# Patient Record
Sex: FEMALE | Race: WHITE | NOT HISPANIC OR LATINO | Employment: OTHER | URBAN - METROPOLITAN AREA
[De-identification: names, ages, dates, MRNs, and addresses within clinical notes are randomized per-mention and may not be internally consistent; named-entity substitution may affect disease eponyms.]

---

## 2020-09-16 ENCOUNTER — OFFICE VISIT (OUTPATIENT)
Dept: OBSTETRICS AND GYNECOLOGY | Facility: CLINIC | Age: 45
End: 2020-09-16
Payer: MEDICARE

## 2020-09-16 VITALS
RESPIRATION RATE: 12 BRPM | HEIGHT: 69 IN | WEIGHT: 171 LBS | DIASTOLIC BLOOD PRESSURE: 67 MMHG | HEART RATE: 72 BPM | BODY MASS INDEX: 25.33 KG/M2 | SYSTOLIC BLOOD PRESSURE: 122 MMHG

## 2020-09-16 DIAGNOSIS — F32.81 PMDD (PREMENSTRUAL DYSPHORIC DISORDER): ICD-10-CM

## 2020-09-16 DIAGNOSIS — Z12.4 CERVICAL CANCER SCREENING: Primary | ICD-10-CM

## 2020-09-16 PROCEDURE — 99203 OFFICE O/P NEW LOW 30 MIN: CPT | Mod: S$PBB,,, | Performed by: ADVANCED PRACTICE MIDWIFE

## 2020-09-16 PROCEDURE — 99999 PR PBB SHADOW E&M-NEW PATIENT-LVL III: ICD-10-PCS | Mod: PBBFAC,,, | Performed by: ADVANCED PRACTICE MIDWIFE

## 2020-09-16 PROCEDURE — 99203 PR OFFICE/OUTPT VISIT, NEW, LEVL III, 30-44 MIN: ICD-10-PCS | Mod: S$PBB,,, | Performed by: ADVANCED PRACTICE MIDWIFE

## 2020-09-16 PROCEDURE — 99203 OFFICE O/P NEW LOW 30 MIN: CPT | Mod: PBBFAC,PN | Performed by: ADVANCED PRACTICE MIDWIFE

## 2020-09-16 PROCEDURE — 99999 PR PBB SHADOW E&M-NEW PATIENT-LVL III: CPT | Mod: PBBFAC,,, | Performed by: ADVANCED PRACTICE MIDWIFE

## 2020-09-16 NOTE — PROGRESS NOTES
Subjective:    Patient ID: Carol Evans is a 45 y.o. y.o. female.     Chief Complaint:   Chief Complaint   Patient presents with    Mood Swings     has suisidal thoughts on cycle, pt refuses to be examined       History of Present Illness:  Carol presents today complaining of pmdd. Symptoms have been present for years. Was seen in 2015 and was prescribed Depo. She did not cont with her rx and did not want to be on meds. She and her mother reports severe anxiety and thoughts of suicide during her menses and the week prior. NO SI/HI today. Discussed benefits of hormones with her diagnosis. She also has a sinificant psych hx and is currently being followed by Psych for her care. She currently lives with her mother and her daughter.  and have been unchanged.       ROS:   Review of Systems   Genitourinary: Negative for menorrhagia, menstrual problem and pelvic pain.   Psychiatric/Behavioral: Positive for depression. The patient is nervous/anxious.    All other systems reviewed and are negative.          Objective:    Vital Signs:  Vitals:    09/16/20 1109   BP: 122/67   Pulse: 72   Resp: 12       Physical Exam:  General:  alert, cooperative, no distress   Head Normocephalic, without obvious abnormality, atraumatic   Neck .supple, symmetrical, trachea midline   Skin:  Skin color, texture, turgor normal. No rashes or lesions   Abdomen:  DEFERRED   Pelvis: Deferred   Extremities extremities normal, atraumatic, no cyanosis or edema   Neurologic Grossly normal              Assessment:      1. PMDD       Plan:      PLAN:   1. Treatment per orders - Ovcon at pt request   2. Call or return to clinic prn if these symptoms worsen or fail to improve as anticipated.

## 2020-10-02 ENCOUNTER — OFFICE VISIT (OUTPATIENT)
Dept: PSYCHIATRY | Facility: CLINIC | Age: 45
End: 2020-10-02
Payer: MEDICARE

## 2020-10-02 VITALS
BODY MASS INDEX: 24.78 KG/M2 | RESPIRATION RATE: 18 BRPM | SYSTOLIC BLOOD PRESSURE: 134 MMHG | TEMPERATURE: 98 F | HEART RATE: 87 BPM | WEIGHT: 167.31 LBS | DIASTOLIC BLOOD PRESSURE: 79 MMHG | HEIGHT: 69 IN

## 2020-10-02 DIAGNOSIS — F41.1 GAD (GENERALIZED ANXIETY DISORDER): ICD-10-CM

## 2020-10-02 DIAGNOSIS — F33.2 SEVERE EPISODE OF RECURRENT MAJOR DEPRESSIVE DISORDER, WITHOUT PSYCHOTIC FEATURES: Primary | ICD-10-CM

## 2020-10-02 PROCEDURE — 99999 PR STA SHADOW: CPT | Mod: PBBFAC,,, | Performed by: STUDENT IN AN ORGANIZED HEALTH CARE EDUCATION/TRAINING PROGRAM

## 2020-10-02 PROCEDURE — 99999 PR PBB SHADOW E&M-EST. PATIENT-LVL III: ICD-10-PCS | Mod: PBBFAC,,, | Performed by: STUDENT IN AN ORGANIZED HEALTH CARE EDUCATION/TRAINING PROGRAM

## 2020-10-02 PROCEDURE — 90792 PSYCH DIAG EVAL W/MED SRVCS: CPT | Performed by: STUDENT IN AN ORGANIZED HEALTH CARE EDUCATION/TRAINING PROGRAM

## 2020-10-02 PROCEDURE — 99213 OFFICE O/P EST LOW 20 MIN: CPT | Mod: PBBFAC | Performed by: STUDENT IN AN ORGANIZED HEALTH CARE EDUCATION/TRAINING PROGRAM

## 2020-10-02 PROCEDURE — 99999 PR PBB SHADOW E&M-EST. PATIENT-LVL III: CPT | Mod: PBBFAC,,, | Performed by: STUDENT IN AN ORGANIZED HEALTH CARE EDUCATION/TRAINING PROGRAM

## 2020-10-02 RX ORDER — LORAZEPAM 0.5 MG/1
0.5 TABLET ORAL DAILY PRN
Qty: 60 TABLET | Refills: 1 | Status: SHIPPED | OUTPATIENT
Start: 2020-10-02 | End: 2020-10-02

## 2020-10-02 RX ORDER — BUPROPION HYDROCHLORIDE 150 MG/1
150 TABLET ORAL DAILY
Qty: 30 TABLET | Refills: 2 | Status: SHIPPED | OUTPATIENT
Start: 2020-10-02 | End: 2021-01-19

## 2020-10-02 RX ORDER — QUETIAPINE FUMARATE 50 MG/1
50 TABLET, FILM COATED ORAL NIGHTLY
Qty: 30 TABLET | Refills: 1 | Status: SHIPPED | OUTPATIENT
Start: 2020-10-02 | End: 2020-10-07

## 2020-10-02 RX ORDER — LORAZEPAM 0.5 MG/1
TABLET ORAL
Qty: 60 TABLET | Refills: 1 | Status: SHIPPED | OUTPATIENT
Start: 2020-10-02 | End: 2020-10-02

## 2020-10-02 RX ORDER — LORAZEPAM 0.5 MG/1
TABLET ORAL
Qty: 60 TABLET | Refills: 1 | Status: SHIPPED | OUTPATIENT
Start: 2020-10-02 | End: 2020-11-12 | Stop reason: ALTCHOICE

## 2020-10-02 NOTE — PROGRESS NOTES
"10/02/2020  12:44 PM  Carol Evans  [id number]        Psychiatric Initial Clinic Evaluation    Chief complaint/reason for seeking care: depression    HPI:    Patient states "I'm suffering..." patient very, immediately, extremely tearful. She states she has had "extreme" anxiety, "horrible thoughts.. like I don't want to live anymore... that I can harm my mother, harm my daughter." She states she started "birth control," in past... felt like a new person, not all the PMS... I feel like it's hormonal." She states insurance did not cover brand any longer, new medication caused stomach aches. She states, "I started having suicidal thoughts since I was 23 when I would have my periods."  She states currently, "I can't even function right now... I'm so depressed."  Depression has been worsening, lost 23 lbs. She states she went  to the hospital last night due to anxiety, "gave me half an ativan," which helped. Patient's mother present through entirety of interview.    Stressors: Chronic neck pain    Psychiatric ROS:    Symptoms of Depression: diminished mood or loss of interest/anhedonia - Yes; diminished energy - Yes, change in sleep - Yes, change in appetite - Yes, diminished concentration or cognition or indecisiveness - Yes, PMA/R -  Yes, excessive guilt or hopelessness or worthlessness - Yes, suicidal ideations - Yes    Changes in Sleep: trouble with initiation- Yes, maintenance, - Yes early morning awakening with inability to return to sleep - Yes, hypersomnolence - No    Suicidal- active/passive ideations - Yes, organized plans, future intentions - No    Homicidal ideations: active/passive ideations - No, organized plans, future intentions - No    Symptoms of psychosis: hallucinations - No, delusions - No, disorganized thinking - No, disorganized behavior or abnormal motor behavior - No, or negative symptoms (diminshed emotional expression, avolition, anhedonia, alogia, asociality) - No     Symptoms of filemon or " hypomania: elevated, expansive, or irritable mood with increased energy or activity - No; with inflated self-esteem or grandiosity - No, decreased need for sleep - No, increased rate of speech - No, FOI or racing thoughts - No, distractibility - No, increased goal directed activity or PMA - No, risky/disinhibited behavior - No    Symptoms of LINSEY: excessive anxiety/worry/fear, more days than not, about numerous issues - Yes, difficult to control - Yes, with restlessness - Yes, fatigue - Yes, poor concentration - Yes, irritability - Yes, muscle tension - Yes, sleep disturbance - Yes; causes functionally impairing distress - Yes    Symptoms of Panic Disorder: recurrent panic attacks (palpitations/heart racing, sweating, shakiness, dyspnea, choking, chest pain/discomfort, Gi symptoms, dizzy/lightheadedness, hot/col flashes, paresthesias, derealization, fear of losing control or fear of dying) - No, precipitated - Yes, un-precipitated - No, source of worry and/or behavioral changes secondary - No, agoraphobia - No    Symptoms of PTSD: h/o trauma - Yes; re-experiencing/intrusive symptoms - No, avoidant behavior - No, negative alterations in cognition or mood - No, hyperarousal symptoms - No; with dissociative symptoms - No    Symptoms of OCD: obsessions (recurrent thoughts/urges/images; intrusive and/or unwanted; uses other thoughts/actions to suppress) - No; compulsions (repetitive behaviors used to lower distress/anxiety/obsessions) - No            PAST MEDICAL & SURGICAL HISTORY   Active Ambulatory Problems     Diagnosis Date Noted    PMDD (premenstrual dysphoric disorder) 08/21/2015    Social anxiety disorder 08/21/2015    LINSEY (generalized anxiety disorder) 08/21/2015     Resolved Ambulatory Problems     Diagnosis Date Noted    No Resolved Ambulatory Problems     Past Medical History:   Diagnosis Date    Anxiety     Depression     Hx of psychiatric care     Psychiatric problem     Therapy        Current  "Outpatient Medications:     escitalopram oxalate (LEXAPRO) 20 MG tablet, TAKE 1 AND 1/2 TABLETS BY MOUTH ONCE A DAY (Patient taking differently: Take 20 mg by mouth once daily. Pt takes one tablet daily), Disp: 30 tablet, Rfl: 1    quetiapine 50 mg oral tb24 (SEROQUEL XR) 50 mg Tb24, Take 1 tablet (50 mg total) by mouth nightly., Disp: 30 tablet, Rfl: 1    NAPROXEN ORAL, Take 500 mg by mouth daily as needed., Disp: , Rfl:     norethindrone-ethinyl estradiol (OVCON) 0.4-35 mg-mcg per tablet, Take 1 tablet by mouth once daily. (Patient not taking: Reported on 10/2/2020), Disp: 30 tablet, Rfl: 11   No past surgical history on file.   Review of patient's allergies indicates:  No Known Allergies          PAST PSYCHIATRIC HISTORY  Previous Psychiatric Hospitalizations: denies   Previous SI/HI: yes  Previous Suicide Attempts: denies  Previous Medication Trials: yes, "I can't remember"  Psychiatric Care (current & past): yes, Dr. Buck  History of Psychotherapy: denies  History of Violence: denies        Home Meds:   Prior to Admission medications    Medication Sig Start Date End Date Taking? Authorizing Provider   escitalopram oxalate (LEXAPRO) 20 MG tablet TAKE 1 AND 1/2 TABLETS BY MOUTH ONCE A DAY  Patient taking differently: Take 20 mg by mouth once daily. Pt takes one tablet daily 11/10/15  Yes Miguel Buck MD   quetiapine 50 mg oral tb24 (SEROQUEL XR) 50 mg Tb24 Take 1 tablet (50 mg total) by mouth nightly. 8/21/15  Yes Miguel Buck MD   NAPROXEN ORAL Take 500 mg by mouth daily as needed.    Historical Provider   norethindrone-ethinyl estradiol (OVCON) 0.4-35 mg-mcg per tablet Take 1 tablet by mouth once daily.  Patient not taking: Reported on 10/2/2020 9/16/20 9/16/21  Uma Rosen CNM           Scheduled Meds:    PRN Meds:    Psychotherapeutics (From admission, onward)    None            NEUROLOGIC HISTORY  Seizures: No  Head trauma: No    SOCIAL " HISTORY:  Developmental/Childhood:Achieved all developmental milestones timely  Education:some college  Employment Status/Finances:Unemployed, in past a [employer]  Relationship Status/Sexual Orientation: Single:    Children: 1  Housing Status: Home with mother and daughter   history:  NO  Access to Firearms: NO  Taoism:Actively participates in organized Sabianism  Recreational activities:Time with family      SUBSTANCE ABUSE HISTORY   Recreational Drugs: denies  Use of Alcohol: denied  Rehab History:no   Tobacco Use:no    LEGAL HISTORY:   Past charges/incarcerations: No   Pending charges:No     FAMILY PSYCHIATRIC HISTORY   Family History   Problem Relation Age of Onset    Colon cancer Mother     Ovarian cancer Mother     Bipolar disorder Father     Breast cancer Neg Hx      Father- alcohol    ROS  Review of Systems   Constitutional: Negative for chills and fever.   HENT: Negative for hearing loss.    Eyes: Negative for blurred vision and double vision.   Respiratory: Negative for shortness of breath.    Cardiovascular: Negative for chest pain and palpitations.   Gastrointestinal: Negative for nausea and vomiting.   Genitourinary: Negative for dysuria.   Musculoskeletal: Negative for back pain and neck pain.   Skin: Negative for rash.   Neurological: Negative for dizziness and headaches.   Endo/Heme/Allergies: Negative for environmental allergies.       Vitals:    10/02/20 1219   BP: 134/79   Pulse: 87   Resp: 18   Temp: 97.8 °F (36.6 °C)       LABORATORY DATA   No results found for this or any previous visit (from the past 72 hour(s)).   No results found for: PHENYTOIN, PHENOBARB, VALPROATE, CBMZ        CONSTITUTIONAL  General Appearance: unremarkable, age appropriate    MUSCULOSKELETAL  Muscle Strength and Tone:no tremor, no tic  Abnormal Involuntary Movements: No  Gait and Station: non-ataxic    PSYCHIATRIC   Level of Consciousness: awake and alert   Orientation: person, place and  situation  Grooming: Casually dressed and Well groomed  Psychomotor Behavior: normal, cooperative  Speech: normal tone, normal rate, normal pitch, normal volume  Language: grossly intact  Mood: depressed  Affect: Consistent with mood  Thought Process: linear, logical  Associations: intact   Thought Content: DENIES suicidal ideation and DENIES homicidal ideation  Perceptions: denies hallucinations  Memory: Able to recall past events, Remote intact and Recent intact  Attention:Attends to interview without distraction  Fund of Knowledge: Aware of current events and Vocabulary appropriate   Estimate if Intelligence:  Average based on work/education history, vocabulary and mental status exam  Insight: has awareness of illness  Judgment: behavior is adequate to circumstances        Assessment/Impression:    MDD, recurrent, severe  LINSEY  Suicidal ideations      Suicide Risk Assessment:    Risk factors  Current ideations: passive, no intent or plan  Prior attempts: no  Anxiety: yes  Hopelessness: no  Impulsivity: no  Psychiatric hospitalizations: no  Substance abuse:no  Psychosis: no  Access to guns: no  Motivation for suicide: depression  Family history of suicide: no    Protective factors  Reason for living: daughter, mother, [employer]  Future oriented:yes  Help seeking:yes  Restoration: yes  Strong social support:yes    Risk reduction:  - ED/911 precautions given  - medications as below  - provided psychotherapeutic support  - encouraged engagement of social supports  - patient given a safety plan  - patient declines voluntary hospitalization at this time      Plan:    MDD, recurrent, severe  - continue Lexapro 20 mg PO qd  - continue Seroquel 50 mg PO qhs  - start Wellbutrin 150 mg PO qd  - consider psychotherapy    LINSEY with panic attacks  - start Ativan 0.5 to 1 mg PO qd PRN for panic attacks  - see depression above  - consider psychotherapy    Suicidal ideations  - see risk assessment above        Discussed diagnosis,  risks and benefits of proposed treatment above vs alternative treatments vs no treatment, potential side effects of these treatments, and the inherent unpredictability of treatment. The patient expresses understanding of the above and displays the capacity to agree with this treatment given said understanding. Patient also agrees that, currently, the benefits outweigh the risks and would like to pursue treatment at this time.       Return to clinic 1 weeks       Azael Li III, MD  10/02/2020

## 2020-10-05 ENCOUNTER — TELEPHONE (OUTPATIENT)
Dept: OBSTETRICS AND GYNECOLOGY | Facility: CLINIC | Age: 45
End: 2020-10-05

## 2020-10-05 NOTE — TELEPHONE ENCOUNTER
Ovcon 35 brand name has been discontinued and generic was filled at last refill. Pt reports that she does not feel like it is working for her PMDD like the Ovcon 35 was. Patient requesting appt to discuss with Dr Weston. States that she was unable to get an appt with Dr Weston last time and would only like to see Dr. Weston. Appt scheduled.

## 2020-10-06 ENCOUNTER — OFFICE VISIT (OUTPATIENT)
Dept: OBSTETRICS AND GYNECOLOGY | Facility: CLINIC | Age: 45
End: 2020-10-06
Payer: MEDICARE

## 2020-10-06 VITALS
BODY MASS INDEX: 24.79 KG/M2 | WEIGHT: 167.38 LBS | HEART RATE: 75 BPM | HEIGHT: 69 IN | DIASTOLIC BLOOD PRESSURE: 86 MMHG | RESPIRATION RATE: 16 BRPM | SYSTOLIC BLOOD PRESSURE: 124 MMHG

## 2020-10-06 DIAGNOSIS — F32.81 PMDD (PREMENSTRUAL DYSPHORIC DISORDER): Primary | ICD-10-CM

## 2020-10-06 PROCEDURE — 99213 OFFICE O/P EST LOW 20 MIN: CPT | Mod: PBBFAC,PN | Performed by: OBSTETRICS & GYNECOLOGY

## 2020-10-06 PROCEDURE — 99213 OFFICE O/P EST LOW 20 MIN: CPT | Mod: S$PBB,,, | Performed by: OBSTETRICS & GYNECOLOGY

## 2020-10-06 PROCEDURE — 99213 PR OFFICE/OUTPT VISIT, EST, LEVL III, 20-29 MIN: ICD-10-PCS | Mod: S$PBB,,, | Performed by: OBSTETRICS & GYNECOLOGY

## 2020-10-06 PROCEDURE — 99999 PR PBB SHADOW E&M-EST. PATIENT-LVL III: CPT | Mod: PBBFAC,,, | Performed by: OBSTETRICS & GYNECOLOGY

## 2020-10-06 PROCEDURE — 99999 PR PBB SHADOW E&M-EST. PATIENT-LVL III: ICD-10-PCS | Mod: PBBFAC,,, | Performed by: OBSTETRICS & GYNECOLOGY

## 2020-10-06 RX ORDER — LEVONORGESTREL AND ETHINYL ESTRADIOL 0.15-0.03
1 KIT ORAL DAILY
Qty: 28 TABLET | Refills: 1 | Status: SHIPPED | OUTPATIENT
Start: 2020-10-06 | End: 2021-10-06

## 2020-10-06 NOTE — PROGRESS NOTES
"  Subjective:    Patient ID: Carol Evans is a 45 y.o. y.o. female.     Chief Complaint:   Chief Complaint   Patient presents with    Menorrhagia     suicidal thoughts while on cycle     Depression       History of Present Illness:  Carol presents today to discuss hormonal changes that occur during the week prior to her cycle and the week of her cycle. She has been seen 3 times now for this complaint. She was previously placed on DepoProvera in 2015 for management of PMDD however did not take it. She was seen 1 month ago and given a Rx of OCPs but did not start it. The patient is not interested in starting any medication. She is here with her elder mother who helps her with her medical care. They both report the patient goes through episodes of anxiety/depression which are worsened during the above mentioned times. Patient is also due for her annual exam but is refusing today. We extensively    This is from the prior note in 2015 "Patient and mother were in the room together and the mother is very concerned. She reports that 1 week prior, during, and 1 week after her cycle she is very emotionally unstable, fatigued, sick, mean, hormonal, depressed, and abusive. She reports having very harmful thoughts about herself and others although today she denies any of these thoughts and states she has never had a plan to carry out anything. She states she even experienced similar issues after the birth of her child 11 years ago were she had thoughts of wanting to harm her child. She reports seeing a psychiatrist before and having counseling but it has not helped. We discussed planing her on contraception to stop her menstrual cycle but she is very reluctant to take medications. She has been on OCPs in the past and reports some have worked great for her while others made her symptoms worse."      ROS:   Review of Systems   Constitutional: Positive for fatigue. Negative for activity change, appetite change, chills, " diaphoresis, fever and unexpected weight change.   HENT: Negative for mouth sores and tinnitus.    Eyes: Negative for discharge and visual disturbance.   Respiratory: Negative for cough, shortness of breath and wheezing.    Cardiovascular: Negative for chest pain, palpitations and leg swelling.   Gastrointestinal: Negative for abdominal pain, bloating, blood in stool, constipation, diarrhea, nausea and vomiting.   Endocrine: Negative for diabetes, hair loss, hot flashes, hyperthyroidism and hypothyroidism.   Genitourinary: Negative for dysuria, flank pain, frequency, genital sores, hematuria, urgency, vaginal bleeding, vaginal discharge, vaginal pain, postcoital bleeding and vaginal odor.   Musculoskeletal: Negative for back pain, joint swelling and myalgias.   Integumentary:  Negative for rash, acne, breast mass, nipple discharge and breast skin changes.   Neurological: Negative for seizures, syncope, numbness and headaches.   Hematological: Negative for adenopathy. Does not bruise/bleed easily.   Psychiatric/Behavioral: Positive for depression and sleep disturbance. The patient is nervous/anxious.    Breast: Negative for mass, mastodynia, nipple discharge and skin changes          Objective:    Vital Signs:  Vitals:    10/06/20 1101   BP: 124/86   Pulse: 75   Resp: 16       Physical Exam:  General:  alert, cooperative, no distress   Head Normocephalic, without obvious abnormality, atraumatic   Neck .supple, symmetrical, trachea midline   Skin:  Skin color, texture, turgor normal. No rashes or lesions   Abdomen:  soft, non-tender; bowel sounds normal   Pelvis: deferred   Extremities extremities normal, atraumatic, no cyanosis or edema   Neurologic Grossly normal          Assessment:      1. PMDD (premenstrual dysphoric disorder)          Plan:      PLAN:   1. Rx of OCPs  2. RTC in 1 month for follow up and annual exam

## 2020-10-07 ENCOUNTER — OFFICE VISIT (OUTPATIENT)
Dept: PSYCHIATRY | Facility: CLINIC | Age: 45
End: 2020-10-07
Payer: MEDICARE

## 2020-10-07 VITALS
BODY MASS INDEX: 24.82 KG/M2 | WEIGHT: 167.56 LBS | HEART RATE: 91 BPM | HEIGHT: 69 IN | SYSTOLIC BLOOD PRESSURE: 118 MMHG | DIASTOLIC BLOOD PRESSURE: 78 MMHG | TEMPERATURE: 97 F | RESPIRATION RATE: 18 BRPM

## 2020-10-07 DIAGNOSIS — F33.2 SEVERE EPISODE OF RECURRENT MAJOR DEPRESSIVE DISORDER, WITHOUT PSYCHOTIC FEATURES: ICD-10-CM

## 2020-10-07 DIAGNOSIS — F41.1 GAD (GENERALIZED ANXIETY DISORDER): Primary | ICD-10-CM

## 2020-10-07 PROCEDURE — 99999 PR STA SHADOW: CPT | Mod: PBBFAC,,, | Performed by: STUDENT IN AN ORGANIZED HEALTH CARE EDUCATION/TRAINING PROGRAM

## 2020-10-07 PROCEDURE — 99213 OFFICE O/P EST LOW 20 MIN: CPT | Mod: PBBFAC | Performed by: STUDENT IN AN ORGANIZED HEALTH CARE EDUCATION/TRAINING PROGRAM

## 2020-10-07 PROCEDURE — 99999 PR PBB SHADOW E&M-EST. PATIENT-LVL III: CPT | Mod: PBBFAC,,, | Performed by: STUDENT IN AN ORGANIZED HEALTH CARE EDUCATION/TRAINING PROGRAM

## 2020-10-07 PROCEDURE — 99999 PR PBB SHADOW E&M-EST. PATIENT-LVL III: ICD-10-PCS | Mod: PBBFAC,,, | Performed by: STUDENT IN AN ORGANIZED HEALTH CARE EDUCATION/TRAINING PROGRAM

## 2020-10-07 PROCEDURE — 99213 OFFICE O/P EST LOW 20 MIN: CPT | Mod: S$PBB | Performed by: STUDENT IN AN ORGANIZED HEALTH CARE EDUCATION/TRAINING PROGRAM

## 2020-10-07 RX ORDER — ESCITALOPRAM OXALATE 20 MG/1
20 TABLET ORAL DAILY
Qty: 30 TABLET | Refills: 3 | Status: SHIPPED | OUTPATIENT
Start: 2020-10-07 | End: 2021-02-18 | Stop reason: SDUPTHER

## 2020-10-07 RX ORDER — QUETIAPINE FUMARATE 50 MG/1
50 TABLET, FILM COATED ORAL NIGHTLY
Qty: 30 TABLET | Refills: 2 | Status: SHIPPED | OUTPATIENT
Start: 2020-10-07 | End: 2020-11-12

## 2020-10-07 NOTE — PROGRESS NOTES
"  10/07/2020  1:33 PM  Carol Evans  [id number]    Outpatient Psychiatry Follow-Up Visit (MD/NP)    10/7/2020    Clinical Status of Patient:  Outpatient (Ambulatory)    Chief Complaint:  Carol Evans is a 45 y.o. female who presents today for follow-up of depression and anxiety.  Met with patient.      Interval History and Content of Current Session:  Interim Events/Subjective Report/Content of Current Session:     Patient presents with her mother again. Patient's mother states, "She's not having the bad thoughts... she's sleeping... went to visit her sister, brought another lady shopping... she laughed.. doing so much better doc." Patient reports, "the nerve pill helped." Last had suicidal thoughts last week. She is taking Lexapro and Seroquel but not taking wellbutrin, "I'm scared to take it... I'd have to be on two antidepressants," not taking OCPs, took one dose and "had my head going crazy." She did not change seroquel from XR to IR, "I don't want generic, only brand name."    Psychiatric Review Of Systems - Is patient experiencing or having changes in:  sleep: improving  appetite: improving  weight: yes  energy/anergy: low  interest/pleasure/anhedonia: improving  somatic symptoms: no  anxiety/panic: yes  guilty/hopelessness: yes  concentration: yes  S.I.B.s/risky behavior: no  Irritability: no  Substance abuse: no  Racing thoughts: no  Impulsive behaviors: no  Paranoia: no  AVH: no          Review of Systems   Review of Systems   Constitutional: Negative for chills and fever.   HENT: Negative for hearing loss.    Eyes: Negative for blurred vision and double vision.   Respiratory: Negative for shortness of breath.    Cardiovascular: Negative for chest pain and palpitations.   Gastrointestinal: Negative for nausea and vomiting.   Genitourinary: Negative for dysuria.   Musculoskeletal: Negative for myalgias.   Skin: Negative for rash.   Neurological: Negative for dizziness and headaches.   Endo/Heme/Allergies: " Negative for environmental allergies.     Past Medical, Family and Social History: The patient's past medical, family and social history have been reviewed and updated as appropriate within the electronic medical record - see encounter notes.    Social History     Socioeconomic History    Marital status: Single     Spouse name: Not on file    Number of children: 1    Years of education: Not on file    Highest education level: Not on file   Occupational History    Not on file   Social Needs    Financial resource strain: Not on file    Food insecurity     Worry: Not on file     Inability: Not on file    Transportation needs     Medical: Not on file     Non-medical: Not on file   Tobacco Use    Smoking status: Never Smoker    Smokeless tobacco: Never Used   Substance and Sexual Activity    Alcohol use: No    Drug use: No    Sexual activity: Not Currently     Partners: Male     Birth control/protection: None     Comment: Single   Lifestyle    Physical activity     Days per week: Not on file     Minutes per session: Not on file    Stress: Not on file   Relationships    Social connections     Talks on phone: Not on file     Gets together: Not on file     Attends Quaker service: Not on file     Active member of club or organization: Not on file     Attends meetings of clubs or organizations: Not on file     Relationship status: Not on file   Other Topics Concern    Patient feels they ought to cut down on drinking/drug use No    Patient annoyed by others criticizing their drinking/drug use No    Patient has felt bad or guilty about drinking/drug use No    Patient has had a drink/used drugs as an eye opener in the AM No   Social History Narrative    Not on file         Compliance: no    Side effects: None    Risk Parameters:  Patient reports no suicidal ideation  Patient reports no homicidal ideation  Patient reports no self-injurious behavior  Patient reports no violent behavior      Exam (detailed:  at least 9 elements; comprehensive: all 15 elements)     Vitals:    10/07/20 1330   BP: 118/78   Pulse: 91   Resp: 18   Temp: 97.3 °F (36.3 °C)       Wt Readings from Last 4 Encounters:   10/07/20 76 kg (167 lb 8.8 oz)   10/06/20 75.9 kg (167 lb 6.4 oz)   10/02/20 75.9 kg (167 lb 5.3 oz)   09/16/20 77.6 kg (171 lb)           CONSTITUTIONAL  General Appearance: unremarkable, age appropriate    MUSCULOSKELETAL  Muscle Strength and Tone:no tremor, no tic  Abnormal Involuntary Movements: No  Gait and Station: non-ataxic    PSYCHIATRIC   Level of Consciousness: awake and alert   Orientation: person, place and situation  Grooming: Casually dressed and Well groomed  Psychomotor Behavior: normal, cooperative  Speech: normal tone, normal rate, normal pitch, normal volume  Language: grossly intact  Mood: depressed  Affect: Consistent with mood  Thought Process: linear, logical  Associations: intact   Thought Content: DENIES suicidal ideation and DENIES homicidal ideation  Perceptions: denies hallucinations  Memory: Able to recall past events, Remote intact and Recent intact  Attention:Attends to interview without distraction  Fund of Knowledge: Aware of current events and Vocabulary appropriate   Estimate if Intelligence:  Average based on work/education history, vocabulary and mental status exam  Insight: has awareness of illness  Judgment: behavior is adequate to circumstances        Assessment and Diagnosis   Status/Progress: Based on the examination today, the patient's problem(s) is/are improving some.  New problems have not been presented today.   Co-morbidities are complicating management of the primary condition.      Assessment/Impression:     MDD, recurrent, severe  LINSEY       Plan:     MDD, recurrent, severe  - continue Lexapro 20 mg PO qd  - continue Seroquel 50 mg PO qhs  - consider psychotherapy     LINSEY with panic attacks  - continue Ativan 0.5 to 1 mg PO qd PRN for panic attacks  - see depression above  -  strongly recommended psychotherapy       - Instructed patient to keep all scheduled appointments, take medications as prescribed and abstain from substance abuse. Instructed to call 911 or present to ER for emergency including SI or HI.    - Discussed diagnosis, risks and benefits of proposed treatment above vs alternative treatments vs no treatment, and potential side effects of these treatments. The patient expresses understanding of the above and displays the capacity to agree with this treatment given said understanding. Patient also agrees that, currently, the benefits outweigh the risks and would like to pursue treatment at this time.         Azael Li III, MD    Return to Clinic: 2 weeks

## 2020-10-22 ENCOUNTER — OFFICE VISIT (OUTPATIENT)
Dept: PSYCHIATRY | Facility: CLINIC | Age: 45
End: 2020-10-22
Payer: MEDICARE

## 2020-10-22 VITALS
HEART RATE: 72 BPM | RESPIRATION RATE: 17 BRPM | WEIGHT: 165.38 LBS | TEMPERATURE: 98 F | DIASTOLIC BLOOD PRESSURE: 73 MMHG | SYSTOLIC BLOOD PRESSURE: 122 MMHG | HEIGHT: 69 IN | BODY MASS INDEX: 24.5 KG/M2

## 2020-10-22 DIAGNOSIS — F33.2 SEVERE EPISODE OF RECURRENT MAJOR DEPRESSIVE DISORDER, WITHOUT PSYCHOTIC FEATURES: Primary | ICD-10-CM

## 2020-10-22 DIAGNOSIS — F41.1 GAD (GENERALIZED ANXIETY DISORDER): ICD-10-CM

## 2020-10-22 PROCEDURE — 99999 PR STA SHADOW: CPT | Mod: PBBFAC,,, | Performed by: STUDENT IN AN ORGANIZED HEALTH CARE EDUCATION/TRAINING PROGRAM

## 2020-10-22 PROCEDURE — 99213 OFFICE O/P EST LOW 20 MIN: CPT | Mod: PBBFAC | Performed by: STUDENT IN AN ORGANIZED HEALTH CARE EDUCATION/TRAINING PROGRAM

## 2020-10-22 PROCEDURE — 90833 PSYTX W PT W E/M 30 MIN: CPT | Performed by: STUDENT IN AN ORGANIZED HEALTH CARE EDUCATION/TRAINING PROGRAM

## 2020-10-22 PROCEDURE — 99213 OFFICE O/P EST LOW 20 MIN: CPT | Mod: S$PBB | Performed by: STUDENT IN AN ORGANIZED HEALTH CARE EDUCATION/TRAINING PROGRAM

## 2020-10-22 PROCEDURE — 99999 PR PBB SHADOW E&M-EST. PATIENT-LVL III: CPT | Mod: PBBFAC,,, | Performed by: STUDENT IN AN ORGANIZED HEALTH CARE EDUCATION/TRAINING PROGRAM

## 2020-10-22 PROCEDURE — 99999 PR PBB SHADOW E&M-EST. PATIENT-LVL III: ICD-10-PCS | Mod: PBBFAC,,, | Performed by: STUDENT IN AN ORGANIZED HEALTH CARE EDUCATION/TRAINING PROGRAM

## 2020-10-22 NOTE — PROGRESS NOTES
"    10/22/2020  1:33 PM  Carol Evans  [id number]    Outpatient Psychiatry Follow-Up Visit (MD/NP)    10/22/2020    Clinical Status of Patient:  Outpatient (Ambulatory)    Chief Complaint:  Carol Evans is a 45 y.o. female who presents today for follow-up of depression and anxiety.  Met with patient.      Interval History and Content of Current Session:  Interim Events/Subjective Report/Content of Current Session:     Reports her mood is still depressed. Patient had difficulty focusing thoughts during interview, patient's mother spoke frequently during interview limiting history from the patient. Patient states she is compliant with Lexapro, taking daily but breaking Seroquel in half due to AM grogginess, waiting on approval from insurance for IR as patient only wants to take brand name. Patient laughing and joking during interview, did not cry during interview today. Per patient's mother "I think she's getting better." Patient states she will consider starting Wellbutrin. Counseled patient on disease model of mental illness, need for medication compliance. They state they are going to see "josseline Childs" for workup of "hormones" per the advice of their family friend.      Psychiatric Review Of Systems - Is patient experiencing or having changes in:  sleep: fluctuating a bit  appetite: improved  weight: yes  energy/anergy: improving  interest/pleasure/anhedonia: improving  somatic symptoms: no  anxiety/panic: yes  guilty/hopelessness: improving  concentration: yes  S.I.B.s/risky behavior: no  Irritability: no  Substance abuse: no  Racing thoughts: no  Impulsive behaviors: no  Paranoia: no  AVH: no        Psychotherapy:  · Target symptoms: depression  · Why chosen therapy is appropriate versus another modality: relevant to diagnosis  · Outcome monitoring methods: self-report, observation  · Therapeutic intervention type: supportive psychotherapy  · Topics discussed/themes: medication compliance, symptom recognition  · The " patient's response to the intervention is accepting. The patient's progress toward treatment goals is fair.   · Duration of intervention: 17 minutes.        Review of Systems   Review of Systems   Constitutional: Negative for chills and fever.   HENT: Negative for hearing loss.    Eyes: Negative for blurred vision and double vision.   Respiratory: Negative for shortness of breath.    Cardiovascular: Negative for chest pain and palpitations.   Gastrointestinal: Negative for nausea and vomiting.   Genitourinary: Negative for dysuria.   Musculoskeletal: Negative for myalgias.   Skin: Negative for rash.   Neurological: Negative for dizziness and headaches.   Endo/Heme/Allergies: Negative for environmental allergies.     Past Medical, Family and Social History: The patient's past medical, family and social history have been reviewed and updated as appropriate within the electronic medical record - see encounter notes.    Social History     Socioeconomic History    Marital status: Single     Spouse name: Not on file    Number of children: 1    Years of education: Not on file    Highest education level: Not on file   Occupational History    Not on file   Social Needs    Financial resource strain: Not on file    Food insecurity     Worry: Not on file     Inability: Not on file    Transportation needs     Medical: Not on file     Non-medical: Not on file   Tobacco Use    Smoking status: Never Smoker    Smokeless tobacco: Never Used   Substance and Sexual Activity    Alcohol use: No    Drug use: No    Sexual activity: Not Currently     Partners: Male     Birth control/protection: None     Comment: Single   Lifestyle    Physical activity     Days per week: Not on file     Minutes per session: Not on file    Stress: Not on file   Relationships    Social connections     Talks on phone: Not on file     Gets together: Not on file     Attends Confucianism service: Not on file     Active member of club or organization:  Not on file     Attends meetings of clubs or organizations: Not on file     Relationship status: Not on file   Other Topics Concern    Patient feels they ought to cut down on drinking/drug use No    Patient annoyed by others criticizing their drinking/drug use No    Patient has felt bad or guilty about drinking/drug use No    Patient has had a drink/used drugs as an eye opener in the AM No   Social History Narrative    Not on file         Compliance: no    Side effects: None    Risk Parameters:  Patient reports no suicidal ideation  Patient reports no homicidal ideation  Patient reports no self-injurious behavior  Patient reports no violent behavior      Exam (detailed: at least 9 elements; comprehensive: all 15 elements)     Vitals:    10/22/20 1215   BP: 122/73   Pulse: 72   Resp: 17   Temp: 98 °F (36.7 °C)       Wt Readings from Last 4 Encounters:   10/22/20 75 kg (165 lb 5.5 oz)   10/07/20 76 kg (167 lb 8.8 oz)   10/06/20 75.9 kg (167 lb 6.4 oz)   10/02/20 75.9 kg (167 lb 5.3 oz)           CONSTITUTIONAL  General Appearance: unremarkable, age appropriate    MUSCULOSKELETAL  Muscle Strength and Tone:no tremor, no tic  Abnormal Involuntary Movements: No  Gait and Station: non-ataxic    PSYCHIATRIC   Level of Consciousness: awake and alert   Orientation: person, place and situation  Grooming: Casually dressed and Well groomed  Psychomotor Behavior: normal, cooperative  Speech: normal tone, normal rate, normal pitch, normal volume  Language: grossly intact  Mood: depressed  Affect: Consistent with mood  Thought Process: linear, logical  Associations: intact   Thought Content: DENIES suicidal ideation and DENIES homicidal ideation  Perceptions: denies hallucinations  Memory: Able to recall past events, Remote intact and Recent intact  Attention:Attends to interview without distraction  Fund of Knowledge: Aware of current events and Vocabulary appropriate   Estimate if Intelligence:  Average based on  work/education history, vocabulary and mental status exam  Insight: has awareness of illness  Judgment: behavior is adequate to circumstances        Assessment and Diagnosis   Status/Progress: Based on the examination today, the patient's problem(s) is/are improving some.  New problems have not been presented today.   Co-morbidities are complicating management of the primary condition.      Assessment/Impression:     MDD, recurrent, severe  LINSEY       Plan:     MDD, recurrent, severe  - continue Lexapro 20 mg PO qd  - continue Seroquel 50 mg PO qhs  - consider psychotherapy  - start Wellbutrin 150 mg PO qd     LINSEY with panic attacks  - continue Ativan 0.5 to 1 mg PO qd PRN for panic attacks  - see depression above  - strongly recommended psychotherapy       - Instructed patient to keep all scheduled appointments, take medications as prescribed and abstain from substance abuse. Instructed to call 911 or present to ER for emergency including SI or HI.    - Discussed diagnosis, risks and benefits of proposed treatment above vs alternative treatments vs no treatment, and potential side effects of these treatments. The patient expresses understanding of the above and displays the capacity to agree with this treatment given said understanding. Patient also agrees that, currently, the benefits outweigh the risks and would like to pursue treatment at this time.         Azael Li III, MD    Return to Clinic: 3 weeks

## 2020-11-12 ENCOUNTER — OFFICE VISIT (OUTPATIENT)
Dept: PSYCHIATRY | Facility: CLINIC | Age: 45
End: 2020-11-12
Payer: MEDICARE

## 2020-11-12 VITALS
WEIGHT: 163.69 LBS | DIASTOLIC BLOOD PRESSURE: 69 MMHG | BODY MASS INDEX: 27.95 KG/M2 | HEIGHT: 64 IN | RESPIRATION RATE: 19 BRPM | HEART RATE: 73 BPM | TEMPERATURE: 98 F | SYSTOLIC BLOOD PRESSURE: 122 MMHG

## 2020-11-12 DIAGNOSIS — F41.1 GAD (GENERALIZED ANXIETY DISORDER): ICD-10-CM

## 2020-11-12 DIAGNOSIS — F33.2 SEVERE EPISODE OF RECURRENT MAJOR DEPRESSIVE DISORDER, WITHOUT PSYCHOTIC FEATURES: Primary | ICD-10-CM

## 2020-11-12 PROCEDURE — 99999 PR PBB SHADOW E&M-EST. PATIENT-LVL III: ICD-10-PCS | Mod: PBBFAC,,, | Performed by: STUDENT IN AN ORGANIZED HEALTH CARE EDUCATION/TRAINING PROGRAM

## 2020-11-12 PROCEDURE — 99213 OFFICE O/P EST LOW 20 MIN: CPT | Mod: PBBFAC | Performed by: STUDENT IN AN ORGANIZED HEALTH CARE EDUCATION/TRAINING PROGRAM

## 2020-11-12 PROCEDURE — 99999 PR PBB SHADOW E&M-EST. PATIENT-LVL III: CPT | Mod: PBBFAC,,, | Performed by: STUDENT IN AN ORGANIZED HEALTH CARE EDUCATION/TRAINING PROGRAM

## 2020-11-12 PROCEDURE — 99999 PR STA SHADOW: CPT | Mod: PBBFAC,,, | Performed by: STUDENT IN AN ORGANIZED HEALTH CARE EDUCATION/TRAINING PROGRAM

## 2020-11-12 PROCEDURE — 99213 OFFICE O/P EST LOW 20 MIN: CPT | Mod: S$PBB | Performed by: STUDENT IN AN ORGANIZED HEALTH CARE EDUCATION/TRAINING PROGRAM

## 2020-11-12 RX ORDER — CLONAZEPAM 0.5 MG/1
0.5 TABLET ORAL 2 TIMES DAILY PRN
Qty: 60 TABLET | Refills: 0 | Status: SHIPPED | OUTPATIENT
Start: 2020-11-12 | End: 2020-12-09 | Stop reason: SDUPTHER

## 2020-11-12 RX ORDER — QUETIAPINE FUMARATE 25 MG/1
50 TABLET, FILM COATED ORAL NIGHTLY
Qty: 30 TABLET | Refills: 2 | Status: SHIPPED | OUTPATIENT
Start: 2020-11-12 | End: 2020-12-09

## 2020-11-12 NOTE — PROGRESS NOTES
"  11/12/2020  1:51 PM  Carol Evans  [id number]    Outpatient Psychiatry Follow-Up Visit (MD/NP)    11/12/2020    Clinical Status of Patient:  Outpatient (Ambulatory)    Chief Complaint:  Carol Evans is a 45 y.o. female who presents today for follow-up of depression and anxiety.  Met with patient.      Interval History and Content of Current Session:  Interim Events/Subjective Report/Content of Current Session:     Reports "in the morning it's really bad, like I don't want to live... I didn't start the wellbutrin, I'm scared to take it, I'm scared of how it'll make me feel." Difficult to obtain history as patient's mother talks frequently and interrupts. "My appetite is better, I do sleep so much better." Patient reports "this morning my brain felt crazy, just so many thoughts."      Psychiatric Review Of Systems - Is patient experiencing or having changes in:  Sleep: improved  appetite: improved  weight: yes  energy/anergy: improving  interest/pleasure/anhedonia: worsening   somatic symptoms: no  anxiety/panic: yes  Guilty/hopelessness: worsening  concentration: yes, difficulty focusing during interview  S.I.B.s/risky behavior: no  Irritability: no  Substance abuse: no  Racing thoughts: no  Impulsive behaviors: no  Paranoia: no  AVH: no          Review of Systems   Review of Systems   Constitutional: Negative for chills and fever.   HENT: Negative for hearing loss.    Eyes: Negative for blurred vision and double vision.   Respiratory: Negative for shortness of breath.    Cardiovascular: Negative for chest pain and palpitations.   Gastrointestinal: Negative for constipation, diarrhea, nausea and vomiting.   Genitourinary: Negative for dysuria.   Musculoskeletal: Negative for back pain and neck pain.   Skin: Negative for rash.   Neurological: Negative for dizziness and headaches.   Endo/Heme/Allergies: Negative for environmental allergies.           Past Medical, Family and Social History: The patient's past medical, " family and social history have been reviewed and updated as appropriate within the electronic medical record - see encounter notes.    Social History     Socioeconomic History    Marital status: Single     Spouse name: Not on file    Number of children: 1    Years of education: Not on file    Highest education level: Not on file   Occupational History    Not on file   Social Needs    Financial resource strain: Not on file    Food insecurity     Worry: Not on file     Inability: Not on file    Transportation needs     Medical: Not on file     Non-medical: Not on file   Tobacco Use    Smoking status: Never Smoker    Smokeless tobacco: Never Used   Substance and Sexual Activity    Alcohol use: No    Drug use: No    Sexual activity: Not Currently     Partners: Male     Birth control/protection: None     Comment: Single   Lifestyle    Physical activity     Days per week: Not on file     Minutes per session: Not on file    Stress: Not on file   Relationships    Social connections     Talks on phone: Not on file     Gets together: Not on file     Attends Confucianism service: Not on file     Active member of club or organization: Not on file     Attends meetings of clubs or organizations: Not on file     Relationship status: Not on file   Other Topics Concern    Patient feels they ought to cut down on drinking/drug use No    Patient annoyed by others criticizing their drinking/drug use No    Patient has felt bad or guilty about drinking/drug use No    Patient has had a drink/used drugs as an eye opener in the AM No   Social History Narrative    Not on file         Compliance: yes    Side effects: None    Risk Parameters:  Patient reports no suicidal ideation  Patient reports no homicidal ideation  Patient reports no self-injurious behavior  Patient reports no violent behavior         Exam (detailed: at least 9 elements; comprehensive: all 15 elements)     Vitals:    11/12/20 1349   BP: 122/69   Pulse: 73    Resp: 19   Temp: 97.7 °F (36.5 °C)     Wt Readings from Last 4 Encounters:   11/12/20 74.2 kg (163 lb 11.1 oz)   10/22/20 75 kg (165 lb 5.5 oz)   10/07/20 76 kg (167 lb 8.8 oz)   10/06/20 75.9 kg (167 lb 6.4 oz)         CONSTITUTIONAL  General Appearance: unremarkable, age appropriate    MUSCULOSKELETAL  Muscle Strength and Tone:no tremor, no tic  Abnormal Involuntary Movements: No  Gait and Station: non-ataxic    PSYCHIATRIC   Level of Consciousness: awake and alert   Orientation: person, place and situation  Grooming: Casually dressed and Well groomed  Psychomotor Behavior: normal, cooperative  Speech: normal tone, normal rate, normal pitch, normal volume  Language: grossly intact  Mood: depressed  Affect: Anxious and Consistent with mood  Thought Process: linear, logical  Associations: intact   Thought Content: DENIES suicidal ideation and DENIES homicidal ideation  Perceptions: denies hallucinations  Memory: Able to recall past events, Remote intact and Recent intact  Attention:Attends to interview without distraction  Fund of Knowledge: Aware of current events and Vocabulary appropriate   Estimate if Intelligence:  Below average based on work/education history, vocabulary and mental status exam  Insight: has awareness of illness  Judgment: behavior is adequate to circumstances          Assessment and Diagnosis   Status/Progress: Based on the examination today, the patient's problem(s) is/are treatment resistant.  New problems have not been presented today.   Co-morbidities are complicating management of the primary condition.      General Impression:   MDD, recurrent, severe  LINSEY        Plan:     MDD, recurrent, severe  - continue Lexapro 20 mg PO qd  - continue Seroquel 25 mg PO qhs  - consider psychotherapy  - encouraging compliance with Wellbutrin 150 mg PO qd; continues to be noncompliant      LINSEY with panic attacks  - discontinue Ativan and switch to Klonopin 0.5 mg PO BID  - see depression above  -  strongly recommended psychotherapy     - Instructed patient to keep all scheduled appointments, take medications as prescribed and abstain from substance abuse. Instructed to call 911 or present to ER for emergency including SI or HI.    - Discussed diagnosis, risks and benefits of proposed treatment above vs alternative treatments vs no treatment, and potential side effects of these treatments. The patient expresses understanding of the above and displays the capacity to agree with this treatment given said understanding. Patient also agrees that, currently, the benefits outweigh the risks and would like to pursue treatment at this time.         Azael Li III, MD    Return to Clinic: 1 month

## 2020-12-08 ENCOUNTER — TELEPHONE (OUTPATIENT)
Dept: PSYCHIATRY | Facility: CLINIC | Age: 45
End: 2020-12-08

## 2020-12-08 NOTE — TELEPHONE ENCOUNTER
Tried phoning patient multiple times at both numbers on file, left several voicemails. Unable to reach patient.

## 2020-12-08 NOTE — TELEPHONE ENCOUNTER
----- Message from Sherin Dmaon sent at 12/8/2020  1:41 PM CST -----  Regarding: Pt requesting a call back today regarding her medicine  Patient Advice:    Pt is requesting a call back today asap regarding her medication and can be reached at [phone]

## 2020-12-09 ENCOUNTER — OFFICE VISIT (OUTPATIENT)
Dept: PSYCHIATRY | Facility: CLINIC | Age: 45
End: 2020-12-09
Payer: MEDICARE

## 2020-12-09 VITALS
DIASTOLIC BLOOD PRESSURE: 75 MMHG | BODY MASS INDEX: 24.72 KG/M2 | WEIGHT: 166.88 LBS | RESPIRATION RATE: 19 BRPM | HEART RATE: 78 BPM | TEMPERATURE: 98 F | SYSTOLIC BLOOD PRESSURE: 126 MMHG | HEIGHT: 69 IN

## 2020-12-09 DIAGNOSIS — F33.2 SEVERE EPISODE OF RECURRENT MAJOR DEPRESSIVE DISORDER, WITHOUT PSYCHOTIC FEATURES: Primary | ICD-10-CM

## 2020-12-09 DIAGNOSIS — F41.1 GAD (GENERALIZED ANXIETY DISORDER): ICD-10-CM

## 2020-12-09 PROCEDURE — 99213 OFFICE O/P EST LOW 20 MIN: CPT | Mod: S$PBB | Performed by: STUDENT IN AN ORGANIZED HEALTH CARE EDUCATION/TRAINING PROGRAM

## 2020-12-09 PROCEDURE — 99999 PR PBB SHADOW E&M-EST. PATIENT-LVL III: ICD-10-PCS | Mod: PBBFAC,,, | Performed by: STUDENT IN AN ORGANIZED HEALTH CARE EDUCATION/TRAINING PROGRAM

## 2020-12-09 PROCEDURE — 99213 OFFICE O/P EST LOW 20 MIN: CPT | Mod: PBBFAC | Performed by: STUDENT IN AN ORGANIZED HEALTH CARE EDUCATION/TRAINING PROGRAM

## 2020-12-09 PROCEDURE — 99999 PR PBB SHADOW E&M-EST. PATIENT-LVL III: CPT | Mod: PBBFAC,,, | Performed by: STUDENT IN AN ORGANIZED HEALTH CARE EDUCATION/TRAINING PROGRAM

## 2020-12-09 PROCEDURE — 99999 PR STA SHADOW: CPT | Mod: PBBFAC,,, | Performed by: STUDENT IN AN ORGANIZED HEALTH CARE EDUCATION/TRAINING PROGRAM

## 2020-12-09 RX ORDER — QUETIAPINE FUMARATE 25 MG/1
25 TABLET, FILM COATED ORAL NIGHTLY
Qty: 30 TABLET | Refills: 2 | Status: SHIPPED | OUTPATIENT
Start: 2020-12-09 | End: 2020-12-10

## 2020-12-09 RX ORDER — CLONAZEPAM 0.5 MG/1
0.5 TABLET ORAL 2 TIMES DAILY PRN
Qty: 60 TABLET | Refills: 1 | Status: SHIPPED | OUTPATIENT
Start: 2020-12-09 | End: 2021-02-18 | Stop reason: SDUPTHER

## 2020-12-09 NOTE — PROGRESS NOTES
"  12/09/2020  1:08 PM  Carol Evans  [id number]    Outpatient Psychiatry Follow-Up Visit (MD/NP)    12/9/2020    Clinical Status of Patient:  Outpatient (Ambulatory)    Chief Complaint:  Carol Evans is a 45 y.o. female who presents today for follow-up of depression and anxiety.  Met with patient.        Interval History and Content of Current Session:  Interim Events/Subjective Report/Content of Current Session:     "I don't like to be on medicine," still has not taken Wellbutrin, "I want to go a different route... I don't like to be on medicine." Did not fill script for Klonopin, due to losing script,  reviewed, prescription was not filled. Per Mother, "she's doing a little better, she doesn't ever look too bad.... she's been driving, driving me everywhere, she cries sometimes."    Per usual, history extremely limited due to interactions between daughter and mother during interview.        Psychiatric Review Of Systems - Is patient experiencing or having changes in:  sleep: yes  appetite: yes  weight: no  energy/anergy: yes  interest/pleasure/anhedonia: no  anxiety/panic: yes  guilty/hopelessness: no  concentration: yes  S.I.B.s/risky behavior: no  Irritability: no  Substance abuse: no  Racing thoughts: no  Impulsive behaviors: no  Paranoia: no  AVH: no            Review of Systems   Review of Systems   Constitutional: Negative for chills and fever.   HENT: Negative for hearing loss.    Eyes: Negative for blurred vision and double vision.   Respiratory: Negative for shortness of breath.    Cardiovascular: Negative for chest pain and palpitations.   Gastrointestinal: Negative for constipation, diarrhea, nausea and vomiting.   Genitourinary: Negative for dysuria.   Musculoskeletal: Negative for back pain and neck pain.   Skin: Negative for rash.   Neurological: Negative for dizziness and headaches.   Endo/Heme/Allergies: Negative for environmental allergies.       Past Medical, Family and Social History: The " patient's past medical, family and social history have been reviewed and updated as appropriate within the electronic medical record - see encounter notes.    Social History     Socioeconomic History    Marital status: Single     Spouse name: Not on file    Number of children: 1    Years of education: Not on file    Highest education level: Not on file   Occupational History    Not on file   Social Needs    Financial resource strain: Not on file    Food insecurity     Worry: Not on file     Inability: Not on file    Transportation needs     Medical: Not on file     Non-medical: Not on file   Tobacco Use    Smoking status: Never Smoker    Smokeless tobacco: Never Used   Substance and Sexual Activity    Alcohol use: No    Drug use: No    Sexual activity: Not Currently     Partners: Male     Birth control/protection: None     Comment: Single   Lifestyle    Physical activity     Days per week: Not on file     Minutes per session: Not on file    Stress: Not on file   Relationships    Social connections     Talks on phone: Not on file     Gets together: Not on file     Attends Restoration service: Not on file     Active member of club or organization: Not on file     Attends meetings of clubs or organizations: Not on file     Relationship status: Not on file   Other Topics Concern    Patient feels they ought to cut down on drinking/drug use No    Patient annoyed by others criticizing their drinking/drug use No    Patient has felt bad or guilty about drinking/drug use No    Patient has had a drink/used drugs as an eye opener in the AM No   Social History Narrative    Not on file       Compliance: no    Side effects: None    Risk Parameters:  Patient reports no suicidal ideation  Patient reports no homicidal ideation  Patient reports no self-injurious behavior  Patient reports no violent behavior          Exam (detailed: at least 9 elements; comprehensive: all 15 elements)     Vitals:    12/09/20 1300    BP: 126/75   Pulse: 78   Resp: 19   Temp: 98.4 °F (36.9 °C)       Wt Readings from Last 4 Encounters:   12/09/20 75.7 kg (166 lb 14.2 oz)   11/12/20 74.2 kg (163 lb 11.1 oz)   10/22/20 75 kg (165 lb 5.5 oz)   10/07/20 76 kg (167 lb 8.8 oz)         CONSTITUTIONAL  General Appearance: unremarkable, age appropriate    MUSCULOSKELETAL  Muscle Strength and Tone:no tremor, no tic  Abnormal Involuntary Movements: No  Gait and Station: non-ataxic    PSYCHIATRIC   Level of Consciousness: awake and alert   Orientation: person, place and situation  Grooming: Casually dressed and Well groomed  Psychomotor Behavior: normal, cooperative  Speech: normal tone, normal rate, normal pitch, normal volume  Language: grossly intact  Mood: depressed  Affect: Consistent with mood  Thought Process: linear, logical  Associations: intact   Thought Content: DENIES suicidal ideation and DENIES homicidal ideation  Perceptions: denies hallucinations  Memory: Able to recall past events, Remote intact and Recent intact  Attention:Impaired to some degree  Fund of Knowledge: Aware of current events and Vocabulary appropriate   Estimate if Intelligence:  Average based on work/education history, vocabulary and mental status exam  Insight: has awareness of illness  Judgment: behavior is adequate to circumstances        Assessment and Diagnosis   Status/Progress: Based on the examination today, the patient's problem(s) is/are inadequately controlled.  New problems have not been presented today.   Co-morbidities are complicating management of the primary condition.      General Impression:   MDD, recurrent, severe  LINSEY        Plan:     MDD, recurrent, severe  - continue Lexapro 20 mg PO qd  - continue Seroquel 25 mg PO qhs  - strongly recommended psychotherapy  - encouraging compliance with Wellbutrin 150 mg PO qd; continues to be noncompliant      LINSEY with panic attacks  - discontinue Ativan and switch to Klonopin 0.5 mg PO BID  - see depression  above  - strongly recommended psychotherapy        - Instructed patient to keep all scheduled appointments, take medications as prescribed and abstain from substance abuse. Instructed to call 911 or present to ER for emergency including SI or HI.    - Discussed diagnosis, risks and benefits of proposed treatment above vs alternative treatments vs no treatment, and potential side effects of these treatments. The patient expresses understanding of the above and displays the capacity to agree with this treatment given said understanding. Patient also agrees that, currently, the benefits outweigh the risks and would like to pursue treatment at this time.         Azael Li III, MD    Return to Clinic: 1 month

## 2020-12-10 RX ORDER — QUETIAPINE FUMARATE 25 MG/1
25 TABLET, FILM COATED ORAL NIGHTLY
Qty: 30 TABLET | Refills: 2 | Status: SHIPPED | OUTPATIENT
Start: 2020-12-10 | End: 2020-12-14

## 2020-12-14 NOTE — TELEPHONE ENCOUNTER
Pharmacy states they will need a new script and it has to be for name brand only. New script submitted for your approval per pharmacy.

## 2020-12-15 RX ORDER — QUETIAPINE FUMARATE 25 MG/1
25 TABLET, FILM COATED ORAL NIGHTLY
Qty: 30 TABLET | Refills: 2 | Status: SHIPPED | OUTPATIENT
Start: 2020-12-15 | End: 2021-02-18 | Stop reason: SDUPTHER

## 2021-01-19 ENCOUNTER — OFFICE VISIT (OUTPATIENT)
Dept: PSYCHIATRY | Facility: CLINIC | Age: 46
End: 2021-01-19
Payer: MEDICARE

## 2021-01-19 VITALS
TEMPERATURE: 98 F | WEIGHT: 169.75 LBS | SYSTOLIC BLOOD PRESSURE: 120 MMHG | HEART RATE: 76 BPM | BODY MASS INDEX: 25.14 KG/M2 | HEIGHT: 69 IN | DIASTOLIC BLOOD PRESSURE: 70 MMHG | RESPIRATION RATE: 18 BRPM

## 2021-01-19 DIAGNOSIS — F41.1 GAD (GENERALIZED ANXIETY DISORDER): ICD-10-CM

## 2021-01-19 DIAGNOSIS — F33.2 SEVERE EPISODE OF RECURRENT MAJOR DEPRESSIVE DISORDER, WITHOUT PSYCHOTIC FEATURES: Primary | ICD-10-CM

## 2021-01-19 DIAGNOSIS — F51.05 INSOMNIA DUE TO OTHER MENTAL DISORDER: ICD-10-CM

## 2021-01-19 DIAGNOSIS — F99 INSOMNIA DUE TO OTHER MENTAL DISORDER: ICD-10-CM

## 2021-01-19 PROCEDURE — 99999 PR PBB SHADOW E&M-EST. PATIENT-LVL III: ICD-10-PCS | Mod: PBBFAC,,, | Performed by: STUDENT IN AN ORGANIZED HEALTH CARE EDUCATION/TRAINING PROGRAM

## 2021-01-19 PROCEDURE — 99213 OFFICE O/P EST LOW 20 MIN: CPT | Mod: PBBFAC | Performed by: STUDENT IN AN ORGANIZED HEALTH CARE EDUCATION/TRAINING PROGRAM

## 2021-01-19 PROCEDURE — 99999 PR STA SHADOW: CPT | Mod: PBBFAC,,, | Performed by: STUDENT IN AN ORGANIZED HEALTH CARE EDUCATION/TRAINING PROGRAM

## 2021-01-19 PROCEDURE — 99999 PR PBB SHADOW E&M-EST. PATIENT-LVL III: CPT | Mod: PBBFAC,,, | Performed by: STUDENT IN AN ORGANIZED HEALTH CARE EDUCATION/TRAINING PROGRAM

## 2021-01-19 PROCEDURE — 99214 OFFICE O/P EST MOD 30 MIN: CPT | Mod: S$PBB | Performed by: STUDENT IN AN ORGANIZED HEALTH CARE EDUCATION/TRAINING PROGRAM

## 2021-02-18 ENCOUNTER — OFFICE VISIT (OUTPATIENT)
Dept: PSYCHIATRY | Facility: CLINIC | Age: 46
End: 2021-02-18
Payer: MEDICARE

## 2021-02-18 VITALS
TEMPERATURE: 97 F | HEART RATE: 87 BPM | BODY MASS INDEX: 25.72 KG/M2 | DIASTOLIC BLOOD PRESSURE: 75 MMHG | HEIGHT: 69 IN | WEIGHT: 173.63 LBS | SYSTOLIC BLOOD PRESSURE: 130 MMHG | RESPIRATION RATE: 17 BRPM

## 2021-02-18 DIAGNOSIS — F41.1 GAD (GENERALIZED ANXIETY DISORDER): Primary | ICD-10-CM

## 2021-02-18 DIAGNOSIS — F99 INSOMNIA DUE TO OTHER MENTAL DISORDER: ICD-10-CM

## 2021-02-18 DIAGNOSIS — F33.2 SEVERE EPISODE OF RECURRENT MAJOR DEPRESSIVE DISORDER, WITHOUT PSYCHOTIC FEATURES: ICD-10-CM

## 2021-02-18 DIAGNOSIS — F51.05 INSOMNIA DUE TO OTHER MENTAL DISORDER: ICD-10-CM

## 2021-02-18 PROCEDURE — 99999 PR PBB SHADOW E&M-EST. PATIENT-LVL III: ICD-10-PCS | Mod: PBBFAC,,, | Performed by: STUDENT IN AN ORGANIZED HEALTH CARE EDUCATION/TRAINING PROGRAM

## 2021-02-18 PROCEDURE — 99999 PR PBB SHADOW E&M-EST. PATIENT-LVL III: CPT | Mod: PBBFAC,,, | Performed by: STUDENT IN AN ORGANIZED HEALTH CARE EDUCATION/TRAINING PROGRAM

## 2021-02-18 PROCEDURE — 99999 PR STA SHADOW: CPT | Mod: PBBFAC,,, | Performed by: STUDENT IN AN ORGANIZED HEALTH CARE EDUCATION/TRAINING PROGRAM

## 2021-02-18 PROCEDURE — 99214 OFFICE O/P EST MOD 30 MIN: CPT | Mod: S$PBB | Performed by: STUDENT IN AN ORGANIZED HEALTH CARE EDUCATION/TRAINING PROGRAM

## 2021-02-18 PROCEDURE — 99213 OFFICE O/P EST LOW 20 MIN: CPT | Mod: PBBFAC | Performed by: STUDENT IN AN ORGANIZED HEALTH CARE EDUCATION/TRAINING PROGRAM

## 2021-02-18 RX ORDER — CLONAZEPAM 0.5 MG/1
0.5 TABLET ORAL DAILY
Qty: 30 TABLET | Refills: 2 | Status: SHIPPED | OUTPATIENT
Start: 2021-03-01 | End: 2021-02-18 | Stop reason: SDUPTHER

## 2021-02-18 RX ORDER — CLONAZEPAM 0.5 MG/1
0.5 TABLET ORAL DAILY
Qty: 30 TABLET | Refills: 5 | Status: SHIPPED | OUTPATIENT
Start: 2021-03-01 | End: 2021-08-24

## 2021-02-18 RX ORDER — QUETIAPINE FUMARATE 25 MG/1
25 TABLET, FILM COATED ORAL NIGHTLY
Qty: 30 TABLET | Refills: 2 | Status: SHIPPED | OUTPATIENT
Start: 2021-02-18 | End: 2021-06-15

## 2021-02-18 RX ORDER — ESCITALOPRAM OXALATE 20 MG/1
20 TABLET ORAL DAILY
Qty: 30 TABLET | Refills: 3 | Status: SHIPPED | OUTPATIENT
Start: 2021-02-18 | End: 2021-07-23

## 2021-08-23 ENCOUNTER — TELEPHONE (OUTPATIENT)
Dept: PSYCHIATRY | Facility: CLINIC | Age: 46
End: 2021-08-23

## 2021-08-24 RX ORDER — CLONAZEPAM 0.5 MG/1
TABLET ORAL
Qty: 60 TABLET | Refills: 2 | Status: SHIPPED | OUTPATIENT
Start: 2021-08-24 | End: 2021-09-13 | Stop reason: SDUPTHER

## 2021-08-24 RX ORDER — QUETIAPINE FUMARATE 25 MG/1
25 TABLET, FILM COATED ORAL NIGHTLY
Qty: 30 TABLET | Refills: 5 | Status: SHIPPED | OUTPATIENT
Start: 2021-08-24 | End: 2021-09-13 | Stop reason: SDUPTHER

## 2021-09-13 DIAGNOSIS — F41.1 GAD (GENERALIZED ANXIETY DISORDER): ICD-10-CM

## 2021-09-13 DIAGNOSIS — F99 INSOMNIA DUE TO OTHER MENTAL DISORDER: Primary | ICD-10-CM

## 2021-09-13 DIAGNOSIS — F51.05 INSOMNIA DUE TO OTHER MENTAL DISORDER: Primary | ICD-10-CM

## 2021-09-13 DIAGNOSIS — F33.2 SEVERE EPISODE OF RECURRENT MAJOR DEPRESSIVE DISORDER, WITHOUT PSYCHOTIC FEATURES: ICD-10-CM

## 2021-09-14 RX ORDER — ESCITALOPRAM OXALATE 20 MG/1
20 TABLET ORAL DAILY
Qty: 30 TABLET | Refills: 3 | Status: SHIPPED | OUTPATIENT
Start: 2021-09-14 | End: 2021-09-15

## 2021-09-14 RX ORDER — CLONAZEPAM 0.5 MG/1
0.5 TABLET ORAL 2 TIMES DAILY PRN
Qty: 60 TABLET | Refills: 2 | Status: SHIPPED | OUTPATIENT
Start: 2021-09-14

## 2021-09-14 RX ORDER — QUETIAPINE FUMARATE 25 MG/1
25 TABLET, FILM COATED ORAL NIGHTLY
Qty: 30 TABLET | Refills: 0 | Status: SHIPPED | OUTPATIENT
Start: 2021-09-14 | End: 2021-09-15

## 2023-07-04 DIAGNOSIS — F41.1 GAD (GENERALIZED ANXIETY DISORDER): ICD-10-CM

## 2023-07-04 DIAGNOSIS — F33.2 SEVERE EPISODE OF RECURRENT MAJOR DEPRESSIVE DISORDER, WITHOUT PSYCHOTIC FEATURES: ICD-10-CM

## 2023-07-05 RX ORDER — ESCITALOPRAM OXALATE 20 MG/1
TABLET ORAL
Qty: 90 TABLET | Refills: 1 | OUTPATIENT
Start: 2023-07-05

## 2023-07-11 DIAGNOSIS — F33.2 SEVERE EPISODE OF RECURRENT MAJOR DEPRESSIVE DISORDER, WITHOUT PSYCHOTIC FEATURES: ICD-10-CM

## 2023-07-11 DIAGNOSIS — F41.1 GAD (GENERALIZED ANXIETY DISORDER): ICD-10-CM

## 2023-07-12 RX ORDER — ESCITALOPRAM OXALATE 20 MG/1
TABLET ORAL
Qty: 90 TABLET | Refills: 1 | OUTPATIENT
Start: 2023-07-12

## 2023-08-14 ENCOUNTER — TELEPHONE (OUTPATIENT)
Dept: PSYCHIATRY | Facility: CLINIC | Age: 48
End: 2023-08-14
Payer: MEDICARE

## 2023-08-14 NOTE — TELEPHONE ENCOUNTER
Spoke to patient's mother and scheduled patient for next available with Dr Li on 9/18, also put her on a wait list to be seen sooner if we have cancellations. She voiced understanding.

## 2023-08-14 NOTE — TELEPHONE ENCOUNTER
----- Message from Lola Rojo LPN sent at 2023 10:09 AM CDT -----  Carol Evans  MRN: [id number]  : 1975  PCP: Daniel Trinidad  Home Phone      [phone]  Work Phone      Not on file.  Mobile          Not on file.      MESSAGE:     Patient requesting call back to schedule appointment. States she was a previous patient.     Call back [phone]

## 2023-09-03 DIAGNOSIS — F41.1 GAD (GENERALIZED ANXIETY DISORDER): ICD-10-CM

## 2023-09-03 DIAGNOSIS — F33.2 SEVERE EPISODE OF RECURRENT MAJOR DEPRESSIVE DISORDER, WITHOUT PSYCHOTIC FEATURES: ICD-10-CM

## 2023-09-05 RX ORDER — ESCITALOPRAM OXALATE 20 MG/1
TABLET ORAL
Qty: 90 TABLET | Refills: 1 | OUTPATIENT
Start: 2023-09-05

## 2023-09-18 DIAGNOSIS — F33.2 SEVERE EPISODE OF RECURRENT MAJOR DEPRESSIVE DISORDER, WITHOUT PSYCHOTIC FEATURES: ICD-10-CM

## 2023-09-18 DIAGNOSIS — F41.1 GAD (GENERALIZED ANXIETY DISORDER): ICD-10-CM

## 2023-09-19 RX ORDER — ESCITALOPRAM OXALATE 20 MG/1
TABLET ORAL
Qty: 90 TABLET | Refills: 1 | OUTPATIENT
Start: 2023-09-19

## 2023-10-04 DIAGNOSIS — F41.1 GAD (GENERALIZED ANXIETY DISORDER): ICD-10-CM

## 2023-10-04 DIAGNOSIS — F33.2 SEVERE EPISODE OF RECURRENT MAJOR DEPRESSIVE DISORDER, WITHOUT PSYCHOTIC FEATURES: ICD-10-CM

## 2023-10-04 RX ORDER — ESCITALOPRAM OXALATE 20 MG/1
TABLET ORAL
Qty: 90 TABLET | Refills: 1 | Status: SHIPPED | OUTPATIENT
Start: 2023-10-04 | End: 2024-02-02

## 2024-02-02 DIAGNOSIS — F33.2 SEVERE EPISODE OF RECURRENT MAJOR DEPRESSIVE DISORDER, WITHOUT PSYCHOTIC FEATURES: ICD-10-CM

## 2024-02-02 DIAGNOSIS — F41.1 GAD (GENERALIZED ANXIETY DISORDER): ICD-10-CM

## 2024-02-02 RX ORDER — ESCITALOPRAM OXALATE 20 MG/1
TABLET ORAL
Qty: 90 TABLET | Refills: 1 | Status: SHIPPED | OUTPATIENT
Start: 2024-02-02

## 2024-09-22 DIAGNOSIS — F33.2 SEVERE EPISODE OF RECURRENT MAJOR DEPRESSIVE DISORDER, WITHOUT PSYCHOTIC FEATURES: ICD-10-CM

## 2024-09-22 DIAGNOSIS — F41.1 GAD (GENERALIZED ANXIETY DISORDER): ICD-10-CM

## 2024-09-23 RX ORDER — ESCITALOPRAM OXALATE 20 MG/1
TABLET ORAL
Qty: 90 TABLET | Refills: 1 | OUTPATIENT
Start: 2024-09-23